# Patient Record
Sex: MALE | Race: WHITE | NOT HISPANIC OR LATINO | Employment: FULL TIME | URBAN - METROPOLITAN AREA
[De-identification: names, ages, dates, MRNs, and addresses within clinical notes are randomized per-mention and may not be internally consistent; named-entity substitution may affect disease eponyms.]

---

## 2025-01-22 ENCOUNTER — HOSPITAL ENCOUNTER (EMERGENCY)
Facility: HOSPITAL | Age: 45
Discharge: HOME | End: 2025-01-22
Attending: EMERGENCY MEDICINE
Payer: COMMERCIAL

## 2025-01-22 ENCOUNTER — HOSPITAL ENCOUNTER (EMERGENCY)
Facility: HOSPITAL | Age: 45
Discharge: OTHER INSTITUTION | End: 2025-01-22
Attending: STUDENT IN AN ORGANIZED HEALTH CARE EDUCATION/TRAINING PROGRAM
Payer: COMMERCIAL

## 2025-01-22 VITALS
HEART RATE: 87 BPM | OXYGEN SATURATION: 97 % | TEMPERATURE: 98 F | HEIGHT: 76 IN | SYSTOLIC BLOOD PRESSURE: 167 MMHG | RESPIRATION RATE: 17 BRPM | WEIGHT: 218 LBS | DIASTOLIC BLOOD PRESSURE: 84 MMHG | BODY MASS INDEX: 26.55 KG/M2

## 2025-01-22 VITALS
RESPIRATION RATE: 16 BRPM | SYSTOLIC BLOOD PRESSURE: 137 MMHG | HEART RATE: 99 BPM | DIASTOLIC BLOOD PRESSURE: 85 MMHG | OXYGEN SATURATION: 97 % | TEMPERATURE: 99 F

## 2025-01-22 DIAGNOSIS — T15.02XA FOREIGN BODY OF LEFT CORNEA, INITIAL ENCOUNTER: Primary | ICD-10-CM

## 2025-01-22 DIAGNOSIS — H11.432 CONJUNCTIVAL INJECTION, LEFT: ICD-10-CM

## 2025-01-22 DIAGNOSIS — Z18.9: Primary | ICD-10-CM

## 2025-01-22 DIAGNOSIS — H44.702: Primary | ICD-10-CM

## 2025-01-22 PROCEDURE — 99282 EMERGENCY DEPT VISIT SF MDM: CPT | Performed by: EMERGENCY MEDICINE

## 2025-01-22 PROCEDURE — 99285 EMERGENCY DEPT VISIT HI MDM: CPT | Performed by: STUDENT IN AN ORGANIZED HEALTH CARE EDUCATION/TRAINING PROGRAM

## 2025-01-22 PROCEDURE — 99284 EMERGENCY DEPT VISIT MOD MDM: CPT

## 2025-01-22 PROCEDURE — 99284 EMERGENCY DEPT VISIT MOD MDM: CPT | Performed by: EMERGENCY MEDICINE

## 2025-01-22 PROCEDURE — 2500000005 HC RX 250 GENERAL PHARMACY W/O HCPCS: Performed by: PHYSICIAN ASSISTANT

## 2025-01-22 RX ORDER — ERYTHROMYCIN 5 MG/G
1 OINTMENT OPHTHALMIC ONCE
Status: COMPLETED | OUTPATIENT
Start: 2025-01-22 | End: 2025-01-22

## 2025-01-22 RX ADMIN — ERYTHROMYCIN 1 CM: 5 OINTMENT OPHTHALMIC at 16:36

## 2025-01-22 ASSESSMENT — LIFESTYLE VARIABLES
TOTAL SCORE: 0
HAVE YOU EVER FELT YOU SHOULD CUT DOWN ON YOUR DRINKING: NO
HAVE PEOPLE ANNOYED YOU BY CRITICIZING YOUR DRINKING: NO
EVER HAD A DRINK FIRST THING IN THE MORNING TO STEADY YOUR NERVES TO GET RID OF A HANGOVER: NO
EVER FELT BAD OR GUILTY ABOUT YOUR DRINKING: NO

## 2025-01-22 ASSESSMENT — COLUMBIA-SUICIDE SEVERITY RATING SCALE - C-SSRS
2. HAVE YOU ACTUALLY HAD ANY THOUGHTS OF KILLING YOURSELF?: NO
1. IN THE PAST MONTH, HAVE YOU WISHED YOU WERE DEAD OR WISHED YOU COULD GO TO SLEEP AND NOT WAKE UP?: NO
6. HAVE YOU EVER DONE ANYTHING, STARTED TO DO ANYTHING, OR PREPARED TO DO ANYTHING TO END YOUR LIFE?: NO
2. HAVE YOU ACTUALLY HAD ANY THOUGHTS OF KILLING YOURSELF?: NO
6. HAVE YOU EVER DONE ANYTHING, STARTED TO DO ANYTHING, OR PREPARED TO DO ANYTHING TO END YOUR LIFE?: NO
1. IN THE PAST MONTH, HAVE YOU WISHED YOU WERE DEAD OR WISHED YOU COULD GO TO SLEEP AND NOT WAKE UP?: NO

## 2025-01-22 ASSESSMENT — PAIN - FUNCTIONAL ASSESSMENT: PAIN_FUNCTIONAL_ASSESSMENT: 0-10

## 2025-01-22 ASSESSMENT — PAIN SCALES - GENERAL: PAINLEVEL_OUTOF10: 2

## 2025-01-22 NOTE — ED PROVIDER NOTES
HPI   Chief Complaint   Patient presents with    Eye Problem     Patient arrives from work s/p getting gorilla glue in left eye. States he immed flushed eye out and it does feel better. No c/o of visual deficits. States eye feels irritated.       This is a 44-year-old male who presents after getting gorilla glue in his left eye.  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  He was wearing his glasses, but as he lifted the mattress up after applying the glue it had dripped into his eye.  He immediately flushed it with water and then applied lubricating drops that were given to him by the nursing staff.  He still feels as if he has a foreign body in his eye, especially along his upper eyelid.  He denies having pain, vision changes or photophobia but has had increased lacrimation.  He does not have any other complaints at this time.              Patient History   No past medical history on file.  No past surgical history on file.  No family history on file.  Social History     Tobacco Use    Smoking status: Not on file    Smokeless tobacco: Not on file   Substance Use Topics    Alcohol use: Not on file    Drug use: Not on file       Physical Exam   ED Triage Vitals [01/22/25 1305]   Temperature Heart Rate Respirations BP   37.2 °C (99 °F) 99 16 137/85      Pulse Ox Temp Source Heart Rate Source Patient Position   97 % Temporal Monitor Sitting      BP Location FiO2 (%)     Right arm --       Physical Exam    Appearance: Alert and oriented.  Well-nourished well-developed male sitting in no distress.    Head: Normocephalic,  atraumatic.    Eyes: PERRLA, EOMI.  Diffuse left conjunctival injection.  Eyelid everted and no visualized foreign body.  No ciliary flush.    ENT: Moist mucous membranes.    Cardiac: Regular rate and rhythm.  No murmur.    Pulmonary: Lungs clear bilaterally with good aeration.  No audible wheezing, rales or rhonchi.    Abdomen: Soft with normoactive bowel sounds throughout.    Musculoskeletal:  Neurovascular intact throughout.    Neurological: No focal or lateralizing deficit.    Psychiatric: Appropriate mood and affect.     ED Course & MDM   Diagnoses as of 01/22/25 1703   Foreign body of left cornea, initial encounter                 No data recorded     Carmen Coma Scale Score: 15 (01/22/25 1304 : Ana Reno, SANDY)                           Medical Decision Making  This is a 44-year-old male who presents after getting gorilla glue in his left eye.  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  He lifted off the mattress and was wearing his glasses but had tripped into his left eye.  He immediately flushed it with water and then apply lubricating drops.  He continues to have a foreign body sensation, especially in the left upper eyelid.  He denies having eye pain, vision changes or photophobia. He presents afebrile and hemodynamically stable.  On examination he has diffuse left conjunctival injection.  There is no visualized foreign body and with eyelid eversion.  Fluorescein dye was used for Woods examination and he does appear to have a very thin layer of glue noted to the mid cornea extending to the 4 o'clock position.  Visually acuity was 20/25 throughout.  His eye was irrigated with saline.  On reexamination there is no change.  I discussed this with [facility] ophthalmologist Dr. Luciano.  She requested ED to ED transfer and will evaluate him further upon arrival.  He was treated with the erythromycin ophthalmic ointment prior to being released from our facility per her request.  Report was given to the [facility] [place] physician.  He will go by private vehicle.        Procedure  Procedures     Juli Kay PA-C  01/22/25 4443

## 2025-01-22 NOTE — ED TRIAGE NOTES
Patient arrives from work s/p getting gorilla glue in left eye. States he immed flushed eye out and it does feel better. No c/o of visual deficits. States eye feels irritated.

## 2025-01-23 NOTE — CONSULTS
"Reason for consult  Glue entered left eye     History Of Present Illness  Gama Schuster is a 44 y.o. 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 He immediately washed out the eye. Patient endorsing some foreign body sensation but denies any vision changes. No eye trauma.       ROS   Patient denies discharge, decreased vision, double vision, blind spots, flashes, curtain coming over vision, or new floaters.     Ophthalmic drops/medications   None     Past Medical History  He has no past medical history on file.    Allergies  No Known Allergies     Last Recorded Vitals  Blood pressure 167/84, pulse 87, temperature 36.7 °C (98 °F), temperature source Temporal, resp. rate 17, height 1.93 m (6' 4\"), weight 98.9 kg (218 lb), SpO2 97%.    Examination   Base Eye Exam       Visual Acuity (Snellen - Linear)         Right Left    Near cc 20/20 20/20      Correction: Glasses              Tonometry (Tonopen, 7:26 AM)         Right Left    Pressure 16 16              Pupils         Pupils Dark Light Shape React APD    Right PERRL, No APD 3 2 Round Brisk -    Left PERRL, No APD 3 2 Round Brisk -              Visual Fields (Counting fingers)         Left Right     Full Full              Extraocular Movement         Right Left     Full Full              Neuro/Psych       Oriented x3: Yes                  Additional Tests       Color         Right Left    Ishihara 11/11 11/11                  Slit Lamp and Fundus Exam       External Exam         Right Left    External Normal Normal              Slit Lamp Exam         Right Left    Lids/Lashes Normal Normal    Conjunctiva/Sclera White and quiet tr injection    Cornea Clear Dens area of SPK temporally 2x4 mm; dense area of SPK inferiorly 1x1 mm    Anterior Chamber Deep and quiet Deep and quiet    Iris Round and reactive Round and reactive    Lens Clear " Clear    Anterior Vitreous Normal Normal              Fundus Exam       Patient deferred dilated fundus examination.                    Assessment & Plan  # Dry eye, left eye   - Gorilla glue entered left eye, patient thoroughly irrigated eye  - Two dense areas of SPK on left cornea but no obvious patches of glue on the cornea. There may be very small amounts of glue at the area of SPK but not significant enough to be able to remove at the slit lamp. Erythromycin maxwell should help with removal if there is some small amount of remaining glue at this site.   - Fornices swept, no foreign bodies found     Recommendations:   - Start erythromycin maxwell BID left eye  - Start artificial tears QID left eye   - Follow-up with  [facility], within 1-2 days. Please call [phone] (EYES) to make appointment. Patient's contact number: [phone]       Thomas Moise MD   Ophthalmology PGY-2    -----------------------------  Ophthalmology Adult Pager - [phone]  Ophthalmology Pediatrics Pager - [phone] (weekdays 8 am - 5 pm)     For adult follow-up appointments, call: [phone]  For pediatric follow-up appointments, call: [phone]    Note not finalized until attending signature.    Brief Key of Common Ophthalmology Abbreviations:  OD: right eye  OS: left eye  OU: both eyes  VA: visual acuity   IOP: intraocular pressure  EOMs: extraocular movements  CVF: confrontational visual fields  DFE: dilated fundus exam  DBH: dot blot hemorrhage  CWS: cotton wool spot  AC: anterior chamber  RD: retinal detachment  PVD: posterior vitreous detachment  CEIOL: cataract extraction with intraocular lens insertion  POAG: primary open-angle glaucoma

## 2025-01-23 NOTE — ED PROVIDER NOTES
History of Present Illness     History provided by: Patient  Limitations to History: None  External Records Reviewed with Brief Summary: Discharge Summary from 01/22/2025 from [facility] ED which showed ***    HPI:  Gama Schuster is a 44 y.o. male presenting to the ED as a transfer from [facility] for ophthalmology evaluation.  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  When patient went to lift a mattress up in the air the glue subsequently dripped into his left eye.  Patient states he realized this happened right away and then went to wash his eye out.  Staff at the nursing home gave the patient eyedrops for dry eye to put in place and the patient immediately drove to [facility] ER.  Patient was evaluated there, reported that he was still experiencing some sensation of foreign body and itching/discomfort of the eye, primarily over the left eyelid.  States that his eyes were tearing more as well.  He was evaluated with a slit-lamp and states that they saw persistent glue in his eye and recommended that he come here to [facility] as they did not have an eye doctor in house.  Patient denies any pain at this time, still feels a sensation that there is something in his eye.    ***     Physical Exam   Triage vitals:  T 36.7 °C (98 °F)  HR 87  /84  RR 17  O2 97 %      General: Awake, alert, in no acute distress  Eyes: Gaze conjugate.  Left conjunctival injection with small amount of discharge from   HENT: Normo-cephalic, atraumatic. No stridor  CV: Regular rate, regular rhythm. Radial pulses 2+ bilaterally  Resp: Breathing non-labored, speaking in full sentences.  Clear to auscultation bilaterally  MSK/Extremities: No gross bony deformities. Moving all extremities  Skin: Warm. Appropriate color  Neuro: Alert. Oriented. Face symmetric. Speech is fluent.  Ambulates without difficulty.     Medical Decision Making & ED Course   Medical Decision  Makin y.o. male ***  ----  {Scoring Tools Utilized:73702}    Differential diagnoses considered include but are not limited to: ***     Social Determinants of Health which Significantly Impact Care: None identified     EKG Independent Interpretation: EKG not obtained    Independent Result Review and Interpretation: None obtained    Chronic conditions affecting the patient's care: As documented above in MDM    The patient was discussed with the following consultants/services: Ophthalmology regarding ***    Care Considerations: As documented above in MDM    ED Course:     Disposition   {ED Disposition:09423}    Procedures   Procedures    Patient seen and discussed with ED attending physician.    Anum Marshall, DO  Emergency Medicine   discussed above.  he was given the opportunity to ask questions.  All of the patient's questions were answered.    Procedures   Procedures    Patient seen and discussed with ED attending physician.    Anum Marshall DO  Emergency Medicine     Anum Marshall DO  Resident  01/24/25 0051

## 2025-01-23 NOTE — DISCHARGE INSTRUCTIONS
Received in the emergency department for retained glue in the eye.  Ophthalmology did an evaluation and they believe that there is only a very small portion of the eye that still has glue.  They recommend that you use the artificial tears prescribed several times a day in the left eye.  You should also continue to use the erythromycin ointment provided to you at the [place] emergency department as directed.  The ophthalmology team would like to see you in their clinic for follow-up within the next week or 2.  They believe that the glue should be fully dissolved by that time, and you should feel better in the next few days.  If they do not call you to schedule a follow-up appointment I have included the clinic number below.  Return to the emergency department if you develop loss of vision in your eye, severe headache, changes in pain in your eye, blurry vision, or any other concerns.  --   Ophthalmology Clinic   [facility]  Phone: [phone]

## 2025-01-24 ENCOUNTER — FOLLOW-UP (OUTPATIENT)
Dept: OPHTHALMOLOGY | Facility: CLINIC | Age: 45
End: 2025-01-24
Payer: COMMERCIAL

## 2025-01-24 DIAGNOSIS — S05.8X2D SUPERFICIAL INJURY OF LEFT CORNEA, SUBSEQUENT ENCOUNTER: Primary | ICD-10-CM

## 2025-01-24 PROBLEM — S05.8X2A SUPERFICIAL INJURY OF LEFT CORNEA: Status: ACTIVE | Noted: 2025-01-24

## 2025-01-24 PROCEDURE — 99202 OFFICE O/P NEW SF 15 MIN: CPT | Performed by: OPTOMETRIST

## 2025-01-24 ASSESSMENT — ENCOUNTER SYMPTOMS
NEUROLOGICAL NEGATIVE: 0
CARDIOVASCULAR NEGATIVE: 0
HEMATOLOGIC/LYMPHATIC NEGATIVE: 0
ENDOCRINE NEGATIVE: 0
CONSTITUTIONAL NEGATIVE: 0
RESPIRATORY NEGATIVE: 0
GASTROINTESTINAL NEGATIVE: 0
EYES NEGATIVE: 1
PSYCHIATRIC NEGATIVE: 0
ALLERGIC/IMMUNOLOGIC NEGATIVE: 0
MUSCULOSKELETAL NEGATIVE: 0

## 2025-01-24 ASSESSMENT — SLIT LAMP EXAM - LIDS
COMMENTS: NORMAL
COMMENTS: NORMAL

## 2025-01-24 ASSESSMENT — VISUAL ACUITY
OD_CC: 20/20
OS_CC: 20/20
METHOD: SNELLEN - LINEAR
OD_CC+: -1
CORRECTION_TYPE: GLASSES

## 2025-01-24 ASSESSMENT — CONF VISUAL FIELD
OD_NORMAL: 1
METHOD: COUNTING FINGERS
OS_SUPERIOR_NASAL_RESTRICTION: 0
OS_INFERIOR_TEMPORAL_RESTRICTION: 0
OD_INFERIOR_TEMPORAL_RESTRICTION: 0
OD_SUPERIOR_TEMPORAL_RESTRICTION: 0
OS_INFERIOR_NASAL_RESTRICTION: 0
OD_INFERIOR_NASAL_RESTRICTION: 0
OS_SUPERIOR_TEMPORAL_RESTRICTION: 0
OS_NORMAL: 1
OD_SUPERIOR_NASAL_RESTRICTION: 0

## 2025-01-24 ASSESSMENT — EXTERNAL EXAM - LEFT EYE: OS_EXAM: NORMAL

## 2025-01-24 ASSESSMENT — EXTERNAL EXAM - RIGHT EYE: OD_EXAM: NORMAL

## 2025-01-24 ASSESSMENT — TONOMETRY
OS_IOP_MMHG: 13
IOP_METHOD: TONOPEN
OD_IOP_MMHG: 12

## 2025-01-24 NOTE — ASSESSMENT & PLAN NOTE
Pt presents as 2 day ED f/u after getting gorilla glue in left eye. Reports symptoms have improved - currently using erythromycin maxwell BID. Anterior findings today show trace SPK inferior with surrounding mild negative staining. Mild conjunctival injection OU.   Pt educated on findings. Ok to stop using erythromycin drops at this time. Continue artifical tears four times per day and add warm compresses daily. Monitor as needed at this time. Pt voiced understanding.

## 2025-01-24 NOTE — PROGRESS NOTES
Assessment/Plan   Problem List Items Addressed This Visit       Superficial injury of left cornea - Primary     Pt presents as 2 day ED f/u after getting gorilla glue in left eye. Reports symptoms have improved - currently using erythromycin maxwell BID. Anterior findings today show trace SPK inferior with surrounding mild negative staining. Mild conjunctival injection OU.   Pt educated on findings. Ok to stop using erythromycin drops at this time. Continue artifical tears four times per day and add warm compresses daily. Monitor as needed at this time. Pt voiced understanding.